# Patient Record
(demographics unavailable — no encounter records)

---

## 2025-01-14 NOTE — ASSESSMENT
[FreeTextEntry1] : He empties bladder well.  Nocturia is unchanged.  He will continue with tamsulosin and finasteride.  He still has dysuria.  Urine studies will be checked again.  If symptoms persist, he should consider cystoscopy.  No interventions needed for small renal cysts.  Pablito Ruiz MD, FACS [facility]  of Urology [address], [address] [place] [address] Tel: [phone] Fax: [phone]

## 2025-01-14 NOTE — HISTORY OF PRESENT ILLNESS
[FreeTextEntry1] : He is an 82-year-old man who is seen today in follow-up.  Patient states that in the last 2 months, he is complaining of burning with urination.  There is no hematuria or flank pain.  He was told by his primary care physician not to have a urinary tract infection.  Residual urine today was 80 mL.  Ultrasound from  radiology in October 2024 showed bilateral renal cysts up to 1.7 cm.  Nocturia is twice.  He is on tamsulosin 0.8 mg and finasteride.  Previous notes: He has history of enlarged prostate and urinary symptoms, erectile dysfunction and low testosterone.  He was feeling weak and tired.  Testosterone level was checked in December 2023 which was 239.  In January 2024, PSA level was 0.3.  He has history of coronary artery disease and diabetes. He is complaining of difficulty obtaining any erections for a long time.  Calculated BMI is 33.  He has an AICD.  Also his list of medications include isosorbide.

## 2025-01-14 NOTE — LETTER BODY
[Dear  ___] : Dear  [unfilled], [Consult Letter:] : I had the pleasure of evaluating your patient, [unfilled]. [Consult Closing:] : Thank you very much for allowing me to participate in the care of this patient.  If you have any questions, please do not hesitate to contact me. [FreeTextEntry1] : Dr. Rachid Castellanos Address: [address] [place] [address] Phone: [phone]

## 2025-01-14 NOTE — PHYSICAL EXAM
[General Appearance - Well Developed] : well developed [General Appearance - Well Nourished] : well nourished [General Appearance - In No Acute Distress] : no acute distress [Abdomen Soft] : soft [Abdomen Tenderness] : non-tender [Urethral Meatus] : meatus normal [Penis Abnormality] : normal circumcised penis [Urinary Bladder Findings] : the bladder was normal on palpation [Testes Tenderness] : no tenderness of the testes [Testes Mass (___cm)] : there were no testicular masses [Prostate Tenderness] : the prostate was not tender [No Prostate Nodules] : no prostate nodules [Normal Station and Gait] : the gait and station were normal for the patient's age [Not Anxious] : not anxious [Inguinal Lymph Nodes Enlarged Bilaterally] : inguinal [de-identified] : Obese [de-identified] : Prostate enlarged, genital exam was done previously

## 2025-03-15 NOTE — CARDIOLOGY SUMMARY
[de-identified] : 3/14/25 Atrial paced aat 70 RBBB [de-identified] : 11/25/2019: Technically difficult study normal LV size with low-normal systolic function, estimated LVEF of  50-55%. Grossly normal RV size and systolic function. Device wire seen within the  right heart Normal biatrial size.   Sclerotic trileaflet aortic valve, trace AI.  Trace physiologic MR and TR.   Estimated PA systolic pressure of 27 mmHg. No significant pericardial effusion. [de-identified] : 2022: Left main artery: Angiography shows mild atherosclerosis.    LAD  Proximal left anterior descending: Angiography shows complete occlusion. SARY 0 flow past ostial LAD . There is a 100 % stenosis.    CX  Proximal circumflex: Angiography shows mild atherosclerosis. SARY  0 flow past OM2. . There is a 40 % stenosis.  RCA  Proximal right coronary artery: Diffuse disease small vessel . There is a 70 % stenosis.  Graft Angiography  LIMA graft to Left anterior descending artery: The segment is visually normal in size and structure. Angiography shows minor irregularities.

## 2025-03-15 NOTE — CARDIOLOGY SUMMARY
[de-identified] : 3/14/25 Atrial paced aat 70 RBBB [de-identified] : 11/25/2019: Technically difficult study normal LV size with low-normal systolic function, estimated LVEF of  50-55%. Grossly normal RV size and systolic function. Device wire seen within the  right heart Normal biatrial size.   Sclerotic trileaflet aortic valve, trace AI.  Trace physiologic MR and TR.   Estimated PA systolic pressure of 27 mmHg. No significant pericardial effusion. [de-identified] : 2022: Left main artery: Angiography shows mild atherosclerosis.    LAD  Proximal left anterior descending: Angiography shows complete occlusion. SARY 0 flow past ostial LAD . There is a 100 % stenosis.    CX  Proximal circumflex: Angiography shows mild atherosclerosis. SARY  0 flow past OM2. . There is a 40 % stenosis.  RCA  Proximal right coronary artery: Diffuse disease small vessel . There is a 70 % stenosis.  Graft Angiography  LIMA graft to Left anterior descending artery: The segment is visually normal in size and structure. Angiography shows minor irregularities.

## 2025-03-15 NOTE — CARDIOLOGY SUMMARY
[de-identified] : 3/14/25 Atrial paced aat 70 RBBB [de-identified] : 11/25/2019: Technically difficult study normal LV size with low-normal systolic function, estimated LVEF of  50-55%. Grossly normal RV size and systolic function. Device wire seen within the  right heart Normal biatrial size.   Sclerotic trileaflet aortic valve, trace AI.  Trace physiologic MR and TR.   Estimated PA systolic pressure of 27 mmHg. No significant pericardial effusion. [de-identified] : 2022: Left main artery: Angiography shows mild atherosclerosis.    LAD  Proximal left anterior descending: Angiography shows complete occlusion. SARY 0 flow past ostial LAD . There is a 100 % stenosis.    CX  Proximal circumflex: Angiography shows mild atherosclerosis. SARY  0 flow past OM2. . There is a 40 % stenosis.  RCA  Proximal right coronary artery: Diffuse disease small vessel . There is a 70 % stenosis.  Graft Angiography  LIMA graft to Left anterior descending artery: The segment is visually normal in size and structure. Angiography shows minor irregularities.

## 2025-03-15 NOTE — HISTORY OF PRESENT ILLNESS
[FreeTextEntry1] : Adama Mejia is an 82y/o man with Hx of HTN, HLD, DMII, depression, hypothyroid, CAD s/p CABG and PCI x3, BPH, and ICM s/p dual chamber ICD now downgraded to PPM who presents today for routine f/u.  S/P Downgrade from ICD to PPM 11/11/2024 ( See EP note). [place] does not carry a dual chamber pacemaker that accept a DF-4 so used a BiV pacemaker that accepts DF-4 lead in [facility] LV port, The RV was capped and sensing is off the LV side. Currently,

## 2025-03-15 NOTE — HISTORY OF PRESENT ILLNESS
[FreeTextEntry1] : Adama Mejia is an 84y/o man with Hx of HTN, HLD, DMII, depression, hypothyroid, CAD s/p CABG and PCI x3, BPH, and ICM s/p dual chamber ICD now downgraded to PPM who presents today for routine f/u.  S/P Downgrade from ICD to PPM 11/11/2024 ( See EP note). [place] does not carry a dual chamber pacemaker that accept a DF-4 so used a BiV pacemaker that accepts DF-4 lead in [facility] LV port, The RV was capped and sensing is off the LV side. Currently,

## 2025-03-15 NOTE — HISTORY OF PRESENT ILLNESS
[FreeTextEntry1] : Adama Mejia is an 82y/o man with Hx of HTN, HLD, DMII, depression, hypothyroid, CAD s/p CABG and PCI x3, BPH, and ICM s/p dual chamber ICD now downgraded to PPM who presents today for routine f/u.  S/P Downgrade from ICD to PPM 11/11/2024 ( See EP note). [place] does not carry a dual chamber pacemaker that accept a DF-4 so used a BiV pacemaker that accepts DF-4 lead in [facility] LV port, The RV was capped and sensing is off the LV side. Currently,

## 2025-03-15 NOTE — DISCUSSION/SUMMARY
[EKG obtained to assist in diagnosis and management of assessed problem(s)] : EKG obtained to assist in diagnosis and management of assessed problem(s) [FreeTextEntry1] : Impression:  1.Hx of ICM: s/p ICD, now downgraded to PPM. EKG performed today to assess for presence of conduction disease and reveals atrial paced rhythm. NO Hx of ICD shocks. ECHO with normal LVEF. Resume routine device checks as scheduled. S/P Downgrade from ICD to PPM 11/11/2024 (See EP note). [place] does not carry a dual chamber pacemaker that accepts a DF-4 so used a BiV pacemaker that accepts DF-4 lead in the LV port, The RV was capped, and sensing is off the LV side. Therefore, the RV impedance will be very high (> 3000 ohms); that is expected.     2. HTN: resume oral antihypertensives as prescribed. Encouraged heart healthy diet, sodium restriction, and weight loss. Continue regular f/u with Cardiologist for further HTN management.  3. HLD: resume statin therapy as prescribed and regular f/u with Cardiologist for routine lipid monitoring and management.   Continue f/u with Cardiologist and RTO for f/u in 6 months.

## 2025-04-02 NOTE — REASON FOR VISIT
[Home] : at home, [unfilled] , at the time of the visit. [Medical Office: ([facility])___] : at the medical office located in  [Telephone (audio)] : This telephonic visit was provided via audio only technology. [Other:___] : KAM [Verbal consent obtained from patient] : the patient, [unfilled]

## 2025-04-02 NOTE — REASON FOR VISIT
[Home] : at home, [unfilled] , at the time of the visit. [Medical Office: ([facility])___] : at the medical office located in  [Telephone (audio)] : This telephonic visit was provided via audio only technology. [Other:___] : KAM [Verbal consent obtained from patient] : the patient, [unfilled]

## 2025-04-02 NOTE — HISTORY OF PRESENT ILLNESS
[FreeTextEntry1] : INCOMPLETE NOTE.... called cell - answered by son direct # for patient [phone] -- called no answer  DALLAS DURAN (: Mar  1 1942) is a 83 year old male with history of carotid artery stenosis.  No previous history of stroke or TIA.  Denies focal neurologic deficits including amaurosis fugax, slurred speech, and weakness in the arms/legs.  Is on atorvastatin.  Not on aspirin.   No new complaints today.  PMH: CAD s/p CABG (R GSV harvest), cardiac stent, SOBE, PPM/AICD, HTN, DM, asthma, HLD  2024 Carotid duplex demonstrates: LORETTA -- moderate 211/27 cm/s LICA -- mild 109/17 cm/s  Bilateral vertebral arteries with antegrade flow   Previous testing 2024 carotid duplex: LORETTA -- 212/17 LICA -- 104/20 Bilateral vertebral arteries with antegrade flow    ...A/P: Asymptomatic moderate carotid artery stenosis. Continue with medical management. Initiation of aspirin 81 mg daily therapy again recommended. Follow up in 6 months for repeat carotid ultrasound.

## 2025-04-02 NOTE — HISTORY OF PRESENT ILLNESS
[FreeTextEntry1] : INCOMPLETE NOTE.... called cell - answered by son direct # for patient [phone] -- called no answer  DALLAS DURAN (: Mar  1 1942) is a 83 year old male with history of carotid artery stenosis.  No previous history of stroke or TIA.  Denies focal neurologic deficits including amaurosis fugax, slurred speech, and weakness in the arms/legs.  Is on atorvastatin.  Not on aspirin.   No new complaints today.  PMH: CAD s/p CABG (R GSV harvest), cardiac stent, SOBE, PPM/AICD, HTN, DM, asthma, HLD  2024 Carotid duplex demonstrates: LORETTA -- moderate 211/27 cm/s LICA -- mild 109/17 cm/s  Bilateral vertebral arteries with antegrade flow   Previous testing 2024 carotid duplex: LORETTA -- 212/17 LICA -- 104/20 Bilateral vertebral arteries with antegrade flow    ...A/P: Asymptomatic moderate carotid artery stenosis. Continue with medical management. Initiation of aspirin 81 mg daily therapy again recommended. Follow up in 6 months for repeat carotid ultrasound.